# Patient Record
Sex: MALE | Race: WHITE | NOT HISPANIC OR LATINO | Employment: OTHER | URBAN - METROPOLITAN AREA
[De-identification: names, ages, dates, MRNs, and addresses within clinical notes are randomized per-mention and may not be internally consistent; named-entity substitution may affect disease eponyms.]

---

## 2017-06-14 ENCOUNTER — OFFICE VISIT (OUTPATIENT)
Dept: FAMILY MEDICINE CLINIC | Facility: CLINIC | Age: 27
End: 2017-06-14

## 2017-06-14 VITALS
HEIGHT: 76 IN | HEART RATE: 62 BPM | OXYGEN SATURATION: 97 % | DIASTOLIC BLOOD PRESSURE: 86 MMHG | WEIGHT: 232.2 LBS | BODY MASS INDEX: 28.27 KG/M2 | SYSTOLIC BLOOD PRESSURE: 134 MMHG | TEMPERATURE: 98.5 F

## 2017-06-14 DIAGNOSIS — S76.011A MUSCLE STRAIN OF GLUTEAL REGION, RIGHT, INITIAL ENCOUNTER: Primary | ICD-10-CM

## 2017-06-14 PROCEDURE — 99203 OFFICE O/P NEW LOW 30 MIN: CPT | Performed by: NURSE PRACTITIONER

## 2017-06-14 RX ORDER — CHLORZOXAZONE 500 MG/1
500 TABLET ORAL 3 TIMES DAILY PRN
Qty: 30 TABLET | Refills: 1 | Status: SHIPPED | OUTPATIENT
Start: 2017-06-14 | End: 2017-09-14

## 2017-06-14 RX ORDER — TRIAMCINOLONE ACETONIDE 55 UG/1
2 SPRAY, METERED NASAL DAILY
COMMUNITY
End: 2020-06-23

## 2017-06-14 RX ORDER — FEXOFENADINE HYDROCHLORIDE 60 MG/1
60 TABLET, FILM COATED ORAL DAILY
COMMUNITY
End: 2020-06-23

## 2017-06-14 NOTE — PROGRESS NOTES
Subjective   Yoan Shaver is a 27 y.o. male who presents today for:    Hip Pain (Dull pain and continual tightness. Has had it looked at before)    Hip Pain    Incident onset: on going for the past 4 to 5 years, had a workup by Dr. Waters in [facility]. There was no injury mechanism (can not identify a specific incident). The pain is present in the right hip (with some lower right flank pain). The quality of the pain is described as aching. The pain is at a severity of 4/10. The pain is moderate. The pain has been constant (always there, pain is more intense at times) since onset. Associated symptoms include numbness (after sitting for long periods of time, not really sure of the location). Pertinent negatives include no inability to bear weight. He reports no foreign bodies present. Exacerbated by: sometimes strenous activity helps and sometime it makes it worse. He has tried rest, NSAIDs, ice and heat (physical therapy for a few months, didn't  notice if it helped) for the symptoms. The treatment provided mild relief.      I have reviewed the patient's medical history in detail and updated the computerized patient record.    Mr. Shaver  reports that he has never smoked. He has never used smokeless tobacco. He reports that he drinks about 1.8 - 6.0 oz of alcohol per week  He reports that he does not use illicit drugs.         Current Outpatient Prescriptions:   •  fexofenadine (ALLEGRA) 60 MG tablet, Take 60 mg by mouth Daily., Disp: , Rfl:   •  Triamcinolone Acetonide (NASACORT) 55 MCG/ACT nasal inhaler, 2 sprays into each nostril Daily., Disp: , Rfl:       The following portions of the patient's history were reviewed and updated as appropriate: allergies, current medications, past social history and problem list.    Review of Systems   Constitutional: Negative.    Respiratory: Negative.    Cardiovascular: Negative.    Genitourinary: Positive for flank pain (right flank and glueteal muscles).  "  Musculoskeletal: Negative for arthralgias, back pain and myalgias.   Skin: Negative.    Neurological: Positive for numbness (after sitting for long periods of time, not really sure of the location).         Objective   Vitals:    06/14/17 1333   BP: 134/86   BP Location: Left arm   Patient Position: Sitting   Cuff Size: Adult   Pulse: 62   Temp: 98.5 °F (36.9 °C)   TempSrc: Oral   SpO2: 97%   Weight: 232 lb 3.2 oz (105 kg)   Height: 76\" (193 cm)     Physical Exam   Constitutional: He is oriented to person, place, and time. He appears well-developed and well-nourished.   Cardiovascular: Normal rate, regular rhythm, normal heart sounds and intact distal pulses.    Abdominal: Soft. Bowel sounds are normal. He exhibits no distension and no mass. There is no tenderness. There is no rebound, no guarding and no CVA tenderness. No hernia.   Musculoskeletal: Normal range of motion. He exhibits no edema or tenderness.        Right hip: Normal. He exhibits normal range of motion, normal strength, no tenderness and no swelling.   Neurological: He is alert and oriented to person, place, and time.   Skin: Skin is warm and dry.   Psychiatric:   No acute distress   Vitals reviewed.        Assessment/Plan   Yoan was seen today for hip pain.    Diagnoses and all orders for this visit:    Muscle strain of gluteal region, right, initial encounter  -     chlorzoxazone (PARAFON FORTE) 500 MG tablet; Take 1 tablet by mouth 3 (Three) Times a Day As Needed for Muscle Spasms.  -     diclofenac (VOLTAREN) 50 MG EC tablet; Take 1 tablet by mouth 2 (Two) Times a Day.    1. I have asked for his medical records from the Dr. Waters's office at [facility] for further evaluation.  2. I will have him start Chlorzoxazone 500 mg three times daily as needed and Diclofenac 50 mg daily as needed.   3. He does not want to go through another round of physical therapy at this time.  4. Follow up at next scheduled appointment.  "

## 2017-09-14 ENCOUNTER — OFFICE VISIT (OUTPATIENT)
Dept: FAMILY MEDICINE CLINIC | Facility: CLINIC | Age: 27
End: 2017-09-14

## 2017-09-14 VITALS
OXYGEN SATURATION: 98 % | DIASTOLIC BLOOD PRESSURE: 74 MMHG | BODY MASS INDEX: 28.95 KG/M2 | SYSTOLIC BLOOD PRESSURE: 106 MMHG | HEIGHT: 76 IN | HEART RATE: 64 BPM | WEIGHT: 237.7 LBS

## 2017-09-14 DIAGNOSIS — M25.551 RIGHT HIP PAIN: ICD-10-CM

## 2017-09-14 DIAGNOSIS — Z00.00 ROUTINE GENERAL MEDICAL EXAMINATION AT HEALTH CARE FACILITY: Primary | ICD-10-CM

## 2017-09-14 PROCEDURE — 99212 OFFICE O/P EST SF 10 MIN: CPT | Performed by: INTERNAL MEDICINE

## 2017-09-14 PROCEDURE — 99395 PREV VISIT EST AGE 18-39: CPT | Performed by: INTERNAL MEDICINE

## 2017-09-14 NOTE — PROGRESS NOTES
"Subjective   Yoan Shaver is a 27 y.o. male who presents today for: Preventive Health Visit.    Hip Pain (New patient/ right hip pain and weakness)    History of Present Illness   Chronic right hip pain (x 5-6 years) without trauma.  It is constant, dull, and worsens with activity, but with no pattern. No better with P.T.  He did not notice any benefit with diclofenac 50 bid for a month and Parafon Forte prescribed for him in June.    Initial evaluation was in 2012 in [place] by Dr. Waters.  MRI was unremarkable except for a suggestion of a slight signal change in the labrum without definite evidence of a labral tear.  Symptoms since then have smoldered, with daily pain but no overall worsening.    Mr. Shaver  reports that he has never smoked. He has never used smokeless tobacco. He reports that he drinks about 1.8 - 6.0 oz of alcohol per week  He reports that he does not use illicit drugs.     No Known Allergies    Current Outpatient Prescriptions:   •  fexofenadine (ALLEGRA) 60 MG tablet, Take 60 mg by mouth Daily., Disp: , Rfl:   •  Triamcinolone Acetonide (NASACORT) 55 MCG/ACT nasal inhaler, 2 sprays into each nostril Daily., Disp: , Rfl:       Review of Systems   Constitutional: Negative for diaphoresis.   Eyes: Negative for visual disturbance.   Respiratory: Negative for cough and chest tightness.    Cardiovascular: Negative for chest pain, palpitations and leg swelling.   Gastrointestinal: Negative for abdominal pain.   Musculoskeletal: Positive for arthralgias. Negative for myalgias.   Neurological: Negative for dizziness, syncope, numbness and headaches.   Hematological: Does not bruise/bleed easily.         Objective   Vitals:    09/14/17 1255   BP: 106/74   BP Location: Left arm   Patient Position: Sitting   Cuff Size: Large Adult   Pulse: 64   SpO2: 98%   Weight: 237 lb 11.2 oz (108 kg)   Height: 76\" (193 cm)     Physical Exam  Well-developed, well-nourished, in no acute distress.  Sclerae are " anicteric and the conjunctivae are pink.  No periorbital edema.  No thyromegaly or mass.  No carotid bruit.  Clear to auscultation bilaterally with good breath sounds throughout all lung fields.  Regular rate and rhythm without murmur.  Normal S1 and S2.  Abdomen is soft, nontender, nondistended, with normoactive bowel sounds.  There is no evidence of hepatosplenomegaly or mass.    No inguinal lymphadenopathy.  No inguinal hernias appreciated.  No erythema, warmth, or effusion about the right hip joint.  There is no tenderness to palpation over the hip, lateral aspect of the pelvis, or as areas bilaterally.  Full range of motion at the lumbar spine, without pain.  Full range of motion of both hips, without crepitus.  There is only mild pain with forced flexion at the right hip.  BASIL test is negative.  Internal rotation at the right hip induces pain along the lateral aspect of the right leg.  Strength is 5+/5 in both lower extremities.  Tight hamstrings bilaterally.    Assessment/Plan   Yoan was seen today for hip pain.    Diagnoses and all orders for this visit:    Routine general medical examination at health care facility  -     Comprehensive Metabolic Panel  -     Lipid Panel  -     CBC (No Diff)  -     TSH Rfx On Abnormal To Free T4    Right hip pain  -     Ambulatory Referral to Orthopedic Surgery    We discussed age-appropriate health maintenance issues today.  He is up-to-date with regard to his tetanus booster.  He will obtain his labs, fasting, in the near future.  We will make further recommendations thereafter.    For the right hip pain, I have asked him to see Dr. Seals for additional evaluation and recommendations.  We discussed the possible etiologies of his right hip pain, and the possible treatment modalities.  I think he would benefit from physical therapy with a focus on ITP band and pelvic muscle stretching.  No additional imaging studies were ordered today; I defer to Dr. Seals regarding  those.

## 2017-09-16 LAB
ALBUMIN SERPL-MCNC: 4.5 G/DL (ref 3.5–5.5)
ALBUMIN/GLOB SERPL: 2.1 {RATIO} (ref 1.2–2.2)
ALP SERPL-CCNC: 68 IU/L (ref 39–117)
ALT SERPL-CCNC: 30 IU/L (ref 0–44)
AST SERPL-CCNC: 21 IU/L (ref 0–40)
BILIRUB SERPL-MCNC: 0.5 MG/DL (ref 0–1.2)
BUN SERPL-MCNC: 18 MG/DL (ref 6–20)
BUN/CREAT SERPL: 13 (ref 9–20)
CALCIUM SERPL-MCNC: 9.4 MG/DL (ref 8.7–10.2)
CHLORIDE SERPL-SCNC: 99 MMOL/L (ref 96–106)
CHOLEST SERPL-MCNC: 164 MG/DL (ref 100–199)
CO2 SERPL-SCNC: 23 MMOL/L (ref 18–29)
CREAT SERPL-MCNC: 1.43 MG/DL (ref 0.76–1.27)
ERYTHROCYTE [DISTWIDTH] IN BLOOD BY AUTOMATED COUNT: 13.4 % (ref 12.3–15.4)
GLOBULIN SER CALC-MCNC: 2.1 G/DL (ref 1.5–4.5)
GLUCOSE SERPL-MCNC: 83 MG/DL (ref 65–99)
HCT VFR BLD AUTO: 46.3 % (ref 37.5–51)
HDLC SERPL-MCNC: 33 MG/DL
HGB BLD-MCNC: 15 G/DL (ref 12.6–17.7)
LDLC SERPL CALC-MCNC: 99 MG/DL (ref 0–99)
MCH RBC QN AUTO: 27.6 PG (ref 26.6–33)
MCHC RBC AUTO-ENTMCNC: 32.4 G/DL (ref 31.5–35.7)
MCV RBC AUTO: 85 FL (ref 79–97)
PLATELET # BLD AUTO: 193 X10E3/UL (ref 150–379)
POTASSIUM SERPL-SCNC: 4.4 MMOL/L (ref 3.5–5.2)
PROT SERPL-MCNC: 6.6 G/DL (ref 6–8.5)
RBC # BLD AUTO: 5.43 X10E6/UL (ref 4.14–5.8)
SODIUM SERPL-SCNC: 143 MMOL/L (ref 134–144)
TRIGL SERPL-MCNC: 159 MG/DL (ref 0–149)
TSH SERPL DL<=0.005 MIU/L-ACNC: 1.72 UIU/ML (ref 0.45–4.5)
VLDLC SERPL CALC-MCNC: 32 MG/DL (ref 5–40)
WBC # BLD AUTO: 5.6 X10E3/UL (ref 3.4–10.8)

## 2019-02-27 ENCOUNTER — OFFICE VISIT (OUTPATIENT)
Dept: FAMILY MEDICINE CLINIC | Facility: CLINIC | Age: 29
End: 2019-02-27

## 2019-02-27 VITALS
SYSTOLIC BLOOD PRESSURE: 120 MMHG | RESPIRATION RATE: 16 BRPM | HEIGHT: 74 IN | DIASTOLIC BLOOD PRESSURE: 72 MMHG | TEMPERATURE: 98.2 F | HEART RATE: 56 BPM | OXYGEN SATURATION: 98 % | BODY MASS INDEX: 32.85 KG/M2 | WEIGHT: 256 LBS

## 2019-02-27 DIAGNOSIS — A07.9: Primary | ICD-10-CM

## 2019-02-27 PROCEDURE — 99213 OFFICE O/P EST LOW 20 MIN: CPT | Performed by: NURSE PRACTITIONER

## 2019-02-27 RX ORDER — METRONIDAZOLE 375 MG/1
375 CAPSULE ORAL 2 TIMES DAILY
Qty: 20 CAPSULE | Refills: 0 | Status: SHIPPED | OUTPATIENT
Start: 2019-02-27 | End: 2019-03-09

## 2019-02-27 NOTE — PROGRESS NOTES
"Subjective   Yoan Shaver is a 29 y.o. male.   Chief Complaint   Patient presents with   • GI Problem     Mr. Shaver presents today with complaints of frequent soft to very loose bowel movements for the past 2 weeks. He and his wife had been in [place] on vacation and returned home two weeks ago. Since arriving home they both have been having loose to soft bowel movements several times a day. I had seen his wife a week ago and her stool culture were positive for protozoa and had been started on Flagel earlier this week when the cultures came back.     I have reviewed the patient's medical history in detail and updated the computerized patient record.     The following portions of the patient's history were reviewed and updated as appropriate: allergies, current medications, past family history, past medical history, past social history, past surgical history and problem list.       Current Outpatient Medications:   •  fexofenadine (ALLEGRA) 60 MG tablet, Take 60 mg by mouth Daily., Disp: , Rfl:   •  Triamcinolone Acetonide (NASACORT) 55 MCG/ACT nasal inhaler, 2 sprays into each nostril Daily., Disp: , Rfl:     Review of Systems   Constitutional: Negative.  Negative for chills and fever.   Respiratory: Negative.    Cardiovascular: Negative.    Gastrointestinal: Negative for abdominal distention, abdominal pain, nausea and vomiting.        Frequent loose to soft stools more than twice a day for the past 2 weeks.         Vitals:    02/27/19 1302   BP: 120/72   BP Location: Left arm   Patient Position: Sitting   Cuff Size: Adult   Pulse: 56   Resp: 16   Temp: 98.2 °F (36.8 °C)   TempSrc: Oral   SpO2: 98%   Weight: 116 kg (256 lb)   Height: 188 cm (74\")       Objective   Physical Exam   Constitutional: He is oriented to person, place, and time. He appears well-developed and well-nourished.   Cardiovascular: Normal rate, regular rhythm and normal heart sounds.   Pulmonary/Chest: Effort normal and breath sounds normal. "   Abdominal: Soft. He exhibits no distension. Bowel sounds are decreased. There is no tenderness.   Neurological: He is alert and oriented to person, place, and time.   Skin: Skin is warm and dry.   Psychiatric:   No acute distress   Vitals reviewed.        Assessment/Plan   Yoan was seen today for gi problem.    Diagnoses and all orders for this visit:    Protozoal intestinal infection    Other orders  -     metroNIDAZOLE (FLAGYL) 375 MG capsule; Take 1 capsule by mouth 2 (Two) Times a Day for 10 days.      1. I will start him on Flagyl 375 mg twice a day x 10 days.   2. He is to follow up as needed or if his symptoms worsen.

## 2019-04-02 ENCOUNTER — OFFICE VISIT (OUTPATIENT)
Dept: FAMILY MEDICINE CLINIC | Facility: CLINIC | Age: 29
End: 2019-04-02

## 2019-04-02 VITALS
SYSTOLIC BLOOD PRESSURE: 124 MMHG | RESPIRATION RATE: 16 BRPM | WEIGHT: 247.5 LBS | HEART RATE: 75 BPM | HEIGHT: 74 IN | TEMPERATURE: 98 F | OXYGEN SATURATION: 98 % | DIASTOLIC BLOOD PRESSURE: 78 MMHG | BODY MASS INDEX: 31.76 KG/M2

## 2019-04-02 DIAGNOSIS — F32.A DEPRESSION, UNSPECIFIED DEPRESSION TYPE: Primary | ICD-10-CM

## 2019-04-02 PROCEDURE — 99214 OFFICE O/P EST MOD 30 MIN: CPT | Performed by: NURSE PRACTITIONER

## 2019-04-02 RX ORDER — BUPROPION HYDROCHLORIDE 75 MG/1
TABLET ORAL
Qty: 60 TABLET | Refills: 5 | Status: SHIPPED | OUTPATIENT
Start: 2019-04-02 | End: 2019-05-16 | Stop reason: SDUPTHER

## 2019-04-02 NOTE — PROGRESS NOTES
"Subjective   Yoan Shaver is a 29 y.o. male.     Chief Complaint   Patient presents with   • Depression     Depression   Visit Type: initial  Onset of symptoms: more than 1 year ago  Progression since onset: gradually worsening  Patient presents with the following symptoms: depressed mood, excessive worry, fatigue, feelings of hopelessness, feelings of worthlessness, insomnia, irritability, muscle tension, nervousness/anxiety and restlessness.  Patient is not experiencing: chest pain, decreased concentration, palpitations and panic.  Frequency of symptoms: most days   Severity: interfering with daily activities   Aggravated by: work stress, social activities and family issues  Sleep quality: non-restorative      I have reviewed the patient's medical history in detail and updated the computerized patient record.     The following portions of the patient's history were reviewed and updated as appropriate: allergies, current medications, past family history, past medical history, past social history, past surgical history and problem list.       Current Outpatient Medications:   •  fexofenadine (ALLEGRA) 60 MG tablet, Take 60 mg by mouth Daily., Disp: , Rfl:   •  Triamcinolone Acetonide (NASACORT) 55 MCG/ACT nasal inhaler, 2 sprays into each nostril Daily., Disp: , Rfl:     Review of Systems   Constitutional: Positive for fatigue and irritability.   Respiratory: Negative.    Cardiovascular: Negative.  Negative for palpitations.   Neurological: Negative.    Psychiatric/Behavioral: Positive for depressed mood. Negative for decreased concentration. The patient is nervous/anxious and has insomnia.         Vitals:    04/02/19 1329   BP: 124/78   BP Location: Right arm   Patient Position: Sitting   Cuff Size: Adult   Pulse: 75   Resp: 16   Temp: 98 °F (36.7 °C)   TempSrc: Oral   SpO2: 98%   Weight: 112 kg (247 lb 8 oz)   Height: 188 cm (74\")       Objective   Physical Exam   Constitutional: He is oriented to person, " place, and time. He appears well-developed and well-nourished.   Cardiovascular: Normal rate, regular rhythm and normal heart sounds.   Pulmonary/Chest: Effort normal and breath sounds normal.   Neurological: He is alert and oriented to person, place, and time.   Skin: Skin is warm and dry.   Psychiatric: His speech is normal and behavior is normal. Judgment and thought content normal. His mood appears anxious. Cognition and memory are normal. He exhibits a depressed mood.   Vitals reviewed.        Assessment/Plan   Yoan was seen today for depression.    Diagnoses and all orders for this visit:    Depression, unspecified depression type    Other orders  -     buPROPion (WELLBUTRIN) 75 MG tablet; Start with one tab daily if needed may increase to one tab twice a day after 2 weeks.    We discussed the treatment options for depression and anxiety. He states he has been to a therapist in the past but feels he needs something else. I will start him on Buproprion 75 mg daily and in two weeks he is to increase the dose to one tab twice a day.   Follow up with me in 6 weeks on his depression.

## 2019-04-10 ENCOUNTER — OFFICE VISIT (OUTPATIENT)
Dept: FAMILY MEDICINE CLINIC | Facility: CLINIC | Age: 29
End: 2019-04-10

## 2019-04-10 VITALS
TEMPERATURE: 98 F | SYSTOLIC BLOOD PRESSURE: 106 MMHG | WEIGHT: 247.8 LBS | HEART RATE: 80 BPM | RESPIRATION RATE: 18 BRPM | DIASTOLIC BLOOD PRESSURE: 68 MMHG | OXYGEN SATURATION: 98 % | BODY MASS INDEX: 31.8 KG/M2 | HEIGHT: 74 IN

## 2019-04-10 DIAGNOSIS — J30.1 SEASONAL ALLERGIC RHINITIS DUE TO POLLEN: Primary | ICD-10-CM

## 2019-04-10 PROCEDURE — 99213 OFFICE O/P EST LOW 20 MIN: CPT | Performed by: NURSE PRACTITIONER

## 2019-04-10 NOTE — PROGRESS NOTES
"Subjective   Yoan Shaver is a 29 y.o. male.     Chief Complaint   Patient presents with   • Nasal Congestion     with facial pressure and blurred vision      Mr. Shaver presents today with complaints of nasal congestion, facial pressure, blurred vision and swollen gland under his jaw. States this has been going on for several weeks since he started the Wellbutrin. He does have a history of allergies and is currently not taking any medications to control his allergy symptoms.     I have reviewed the patient's medical history in detail and updated the computerized patient record.    The following portions of the patient's history were reviewed and updated as appropriate: allergies, current medications, past family history, past medical history, past social history, past surgical history and problem list.       Current Outpatient Medications:   •  buPROPion (WELLBUTRIN) 75 MG tablet, Start with one tab daily if needed may increase to one tab twice a day after 2 weeks., Disp: 60 tablet, Rfl: 5  •  fexofenadine (ALLEGRA) 60 MG tablet, Take 60 mg by mouth Daily., Disp: , Rfl:   •  Triamcinolone Acetonide (NASACORT) 55 MCG/ACT nasal inhaler, 2 sprays into each nostril Daily., Disp: , Rfl:     Review of Systems   Constitutional: Negative for fatigue and fever.   HENT: Positive for congestion (ears and glands) and sinus pressure. Negative for postnasal drip and sore throat.    Eyes: Positive for pain and visual disturbance.   Respiratory: Negative for cough.    Neurological: Positive for headache.        Vitals:    04/10/19 1315   BP: 106/68   BP Location: Left arm   Patient Position: Sitting   Cuff Size: Adult   Pulse: 80   Resp: 18   Temp: 98 °F (36.7 °C)   TempSrc: Oral   SpO2: 98%   Weight: 112 kg (247 lb 12.8 oz)   Height: 188 cm (74\")       Objective   Physical Exam   Constitutional: He is oriented to person, place, and time. He appears well-developed and well-nourished.   HENT:   Right Ear: Tympanic membrane " normal.   Left Ear: Tympanic membrane normal.   Nose: Mucosal edema, rhinorrhea and congestion present. Right sinus exhibits no maxillary sinus tenderness and no frontal sinus tenderness. Left sinus exhibits no maxillary sinus tenderness and no frontal sinus tenderness.   Mouth/Throat: Uvula is midline and oropharynx is clear and moist.   Eyes: Conjunctivae and EOM are normal. Pupils are equal, round, and reactive to light.   Neck: Normal range of motion. Neck supple.   Cardiovascular: Normal rate, regular rhythm, normal heart sounds and intact distal pulses.   Pulmonary/Chest: Effort normal and breath sounds normal.   Lymphadenopathy:     He has no cervical adenopathy.   Neurological: He is alert and oriented to person, place, and time.   Skin: Skin is warm and dry.   Psychiatric:   No acute distress   Vitals reviewed.        Assessment/Plan   Yoan was seen today for nasal congestion.    Diagnoses and all orders for this visit:    Seasonal allergic rhinitis due to pollen      You have been diagnosed with allergic rhinitis. Symptomatic treatment is best.  You may take Mucinex D for relieving congestion and cough.  If you have high blood pressure, do not take Mucinex D, instead opting for plain Mucinex and Coricidin HBP. Oral antihistamine, such as Allegra, Zyrtec or Claritin may help reduce ear pressure and relieve some nasal symptoms.  A saline nasal spray may be used to keep nose clear from discharge.  Be sure that you are increasing your intake of clear to decaffeinated fluids and get plenty of rest.  If your symptoms worsen or persist follow up as needed.

## 2019-05-16 ENCOUNTER — OFFICE VISIT (OUTPATIENT)
Dept: FAMILY MEDICINE CLINIC | Facility: CLINIC | Age: 29
End: 2019-05-16

## 2019-05-16 VITALS
SYSTOLIC BLOOD PRESSURE: 112 MMHG | HEART RATE: 78 BPM | WEIGHT: 246.6 LBS | BODY MASS INDEX: 31.65 KG/M2 | TEMPERATURE: 98 F | RESPIRATION RATE: 18 BRPM | OXYGEN SATURATION: 98 % | HEIGHT: 74 IN | DIASTOLIC BLOOD PRESSURE: 78 MMHG

## 2019-05-16 DIAGNOSIS — F32.A DEPRESSION, UNSPECIFIED DEPRESSION TYPE: Primary | ICD-10-CM

## 2019-05-16 PROCEDURE — 99213 OFFICE O/P EST LOW 20 MIN: CPT | Performed by: NURSE PRACTITIONER

## 2019-05-16 RX ORDER — BUPROPION HYDROCHLORIDE 75 MG/1
TABLET ORAL
Qty: 180 TABLET | Refills: 3 | Status: SHIPPED | OUTPATIENT
Start: 2019-05-16 | End: 2020-06-15 | Stop reason: SDUPTHER

## 2019-05-16 NOTE — PROGRESS NOTES
"Subjective   Yoan Shaver is a 29 y.o. male.     Chief Complaint   Patient presents with   • Depression     f/u      Mr. Shaver presents today to follow up on his depression/anxiety issues. I had first seen him about this 6 weeks ago and had started him on Wellbutrin 75 mg daily with instructions to titrate up if needed. He states he is doing well on the 75 mg daily.     I have reviewed the patient's medical history in detail and updated the computerized patient record.     The following portions of the patient's history were reviewed and updated as appropriate: allergies, current medications, past family history, past medical history, past social history, past surgical history and problem list.       Current Outpatient Medications:   •  buPROPion (WELLBUTRIN) 75 MG tablet, Start with one tab daily if needed may increase to one tab twice a day after 2 weeks., Disp: 60 tablet, Rfl: 5  •  fexofenadine (ALLEGRA) 60 MG tablet, Take 60 mg by mouth Daily., Disp: , Rfl:   •  Triamcinolone Acetonide (NASACORT) 55 MCG/ACT nasal inhaler, 2 sprays into each nostril Daily., Disp: , Rfl:     Review of Systems   Constitutional: Negative.    Respiratory: Negative.    Cardiovascular: Negative.    Neurological: Negative.    Psychiatric/Behavioral: Positive for decreased concentration (improving) and depressed mood (has improved). The patient is nervous/anxious (improving).         Vitals:    05/16/19 0915   BP: 112/78   BP Location: Left arm   Patient Position: Sitting   Cuff Size: Adult   Pulse: 78   Resp: 18   Temp: 98 °F (36.7 °C)   TempSrc: Oral   SpO2: 98%   Weight: 112 kg (246 lb 9.6 oz)   Height: 188 cm (74\")       Objective   Physical Exam   Constitutional: He is oriented to person, place, and time. He appears well-developed and well-nourished.   Cardiovascular: Normal rate, regular rhythm, normal heart sounds and intact distal pulses.   Pulmonary/Chest: Effort normal and breath sounds normal.   Neurological: He is alert " and oriented to person, place, and time.   Skin: Skin is warm and dry.   Psychiatric:   No acute distress   Vitals reviewed.        Assessment/Plan   Yoan was seen today for depression.    Diagnoses and all orders for this visit:    Depression, unspecified depression type    Other orders  -     buPROPion (WELLBUTRIN) 75 MG tablet; Start with one tab daily if needed may increase to one tab twice a day after 2 weeks.      He is to continue with the Wellbutrin 75 mg daily as prescribed. He may titrate up to 150 mg if needed. He is to return in February for his next annual physical exam and as needed.

## 2020-02-06 DIAGNOSIS — Z00.00 ROUTINE GENERAL MEDICAL EXAMINATION AT A HEALTH CARE FACILITY: Primary | ICD-10-CM

## 2020-02-09 ENCOUNTER — RESULTS ENCOUNTER (OUTPATIENT)
Dept: FAMILY MEDICINE CLINIC | Facility: CLINIC | Age: 30
End: 2020-02-09

## 2020-02-09 DIAGNOSIS — Z00.00 ROUTINE GENERAL MEDICAL EXAMINATION AT A HEALTH CARE FACILITY: ICD-10-CM

## 2020-06-10 RX ORDER — BUPROPION HYDROCHLORIDE 75 MG/1
TABLET ORAL
Qty: 180 TABLET | Refills: 3 | OUTPATIENT
Start: 2020-06-10

## 2020-06-15 RX ORDER — BUPROPION HYDROCHLORIDE 75 MG/1
75 TABLET ORAL 2 TIMES DAILY
Qty: 180 TABLET | Refills: 0 | Status: SHIPPED | OUTPATIENT
Start: 2020-06-15 | End: 2020-09-17 | Stop reason: SDUPTHER

## 2020-06-23 ENCOUNTER — OFFICE VISIT (OUTPATIENT)
Dept: FAMILY MEDICINE CLINIC | Facility: CLINIC | Age: 30
End: 2020-06-23

## 2020-06-23 VITALS
HEIGHT: 76 IN | SYSTOLIC BLOOD PRESSURE: 100 MMHG | BODY MASS INDEX: 28.62 KG/M2 | TEMPERATURE: 98.7 F | WEIGHT: 235 LBS | OXYGEN SATURATION: 98 % | DIASTOLIC BLOOD PRESSURE: 70 MMHG | HEART RATE: 57 BPM

## 2020-06-23 DIAGNOSIS — Z00.00 ANNUAL PHYSICAL EXAM: Primary | ICD-10-CM

## 2020-06-23 LAB
ALBUMIN SERPL-MCNC: 4.6 G/DL (ref 3.5–5.2)
ALBUMIN/GLOB SERPL: 2.2 G/DL
ALP SERPL-CCNC: 54 U/L (ref 39–117)
ALT SERPL-CCNC: 14 U/L (ref 1–41)
AST SERPL-CCNC: 12 U/L (ref 1–40)
BILIRUB SERPL-MCNC: 0.8 MG/DL (ref 0.2–1.2)
BUN SERPL-MCNC: 17 MG/DL (ref 6–20)
BUN/CREAT SERPL: 13.5 (ref 7–25)
CALCIUM SERPL-MCNC: 9.5 MG/DL (ref 8.6–10.5)
CHLORIDE SERPL-SCNC: 103 MMOL/L (ref 98–107)
CHOLEST SERPL-MCNC: 192 MG/DL (ref 0–200)
CHOLEST/HDLC SERPL: 4.8 {RATIO}
CO2 SERPL-SCNC: 29.2 MMOL/L (ref 22–29)
CREAT SERPL-MCNC: 1.26 MG/DL (ref 0.76–1.27)
GLOBULIN SER CALC-MCNC: 2.1 GM/DL
GLUCOSE SERPL-MCNC: 98 MG/DL (ref 65–99)
HDLC SERPL-MCNC: 40 MG/DL (ref 40–60)
LDLC SERPL CALC-MCNC: 133 MG/DL (ref 0–100)
POTASSIUM SERPL-SCNC: 4.1 MMOL/L (ref 3.5–5.2)
PROT SERPL-MCNC: 6.7 G/DL (ref 6–8.5)
SODIUM SERPL-SCNC: 140 MMOL/L (ref 136–145)
TRIGL SERPL-MCNC: 96 MG/DL (ref 0–150)
VLDLC SERPL CALC-MCNC: 19.2 MG/DL

## 2020-06-23 PROCEDURE — 99395 PREV VISIT EST AGE 18-39: CPT | Performed by: NURSE PRACTITIONER

## 2020-06-23 NOTE — PROGRESS NOTES
Subjective   Yoan Shaver is a 30 y.o. male.     Chief Complaint   Patient presents with   • Annual Exam     Mr. Shaver presents today for his annual physical exam. He states he is doing well and voices no concerns today.      I have reviewed the patient's medical history in detail and updated the computerized patient record.    The following portions of the patient's history were reviewed and updated as appropriate: allergies, current medications, past family history, past medical history, past social history, past surgical history and problem list.      Current Outpatient Medications:   •  buPROPion (Wellbutrin) 75 MG tablet, Take 1 tablet by mouth 2 (Two) Times a Day., Disp: 180 tablet, Rfl: 0     Review of Systems   Constitutional: Negative.    Respiratory: Negative.    Cardiovascular: Negative.    Gastrointestinal: Negative.    Endocrine: Negative.    Genitourinary: Negative.    Musculoskeletal: Negative.    Psychiatric/Behavioral: Negative.    All other systems reviewed and are negative.      Objective    Vitals:    06/23/20 0921   BP: 100/70   Pulse: 57   Temp: 98.7 °F (37.1 °C)   SpO2: 98%     Physical Exam   Constitutional: He is oriented to person, place, and time. He appears well-developed and well-nourished.   Neck: Normal range of motion. Neck supple. No thyromegaly present.   Cardiovascular: Normal rate, regular rhythm and normal heart sounds.   Pulmonary/Chest: Effort normal and breath sounds normal.   Musculoskeletal: Normal range of motion. He exhibits no edema.   Lymphadenopathy:     He has no cervical adenopathy.   Neurological: He is alert and oriented to person, place, and time.   Skin: Skin is warm and dry.   Psychiatric:   No acute distress   Vitals reviewed.        Assessment/Plan   Yoan was seen today for annual exam.    Diagnoses and all orders for this visit:    Annual physical exam      Mr. Shaver's physical assessment is unremarkable today.   He has had coffee today with honey,  but I will have him get his labs drawn today while he is here.   We discussed age appropriate healthy behaviors such as eating a healthy diet and getting regular exercise.   He is to continue all medications as prescribed.   He is to follow up as needed and in 1 year for his next annual exam.

## 2020-09-17 RX ORDER — BUPROPION HYDROCHLORIDE 75 MG/1
75 TABLET ORAL 2 TIMES DAILY
Qty: 180 TABLET | Refills: 3 | Status: SHIPPED | OUTPATIENT
Start: 2020-09-17

## 2021-01-29 ENCOUNTER — TELEPHONE (OUTPATIENT)
Dept: FAMILY MEDICINE CLINIC | Facility: CLINIC | Age: 31
End: 2021-01-29

## 2021-02-01 NOTE — TELEPHONE ENCOUNTER
S/w patient- checked on status since I have not received it. Patient said he was having issues. Confirmed fax number. Will await fax.

## 2021-02-01 NOTE — TELEPHONE ENCOUNTER
Caller: Yoan Shaver    Relationship to patient: Self    Best call back number: [phone]    Patient is needing: PATIENT IS CALLING WITH FAX NUMBER: [phone]

## 2021-02-01 NOTE — TELEPHONE ENCOUNTER
Received and completed form. Patient asked me to email and I told him I can fax or mail to him. He will call me back with a fax number.    Hub: OK to get the fax number for me or transfer him if he calls.    Thanks,  Stephanie

## 2021-04-16 ENCOUNTER — BULK ORDERING (OUTPATIENT)
Dept: CASE MANAGEMENT | Facility: OTHER | Age: 31
End: 2021-04-16

## 2021-04-16 DIAGNOSIS — Z23 IMMUNIZATION DUE: ICD-10-CM
